# Patient Record
Sex: MALE | Race: WHITE | NOT HISPANIC OR LATINO | Employment: FULL TIME | URBAN - METROPOLITAN AREA
[De-identification: names, ages, dates, MRNs, and addresses within clinical notes are randomized per-mention and may not be internally consistent; named-entity substitution may affect disease eponyms.]

---

## 2017-05-17 ENCOUNTER — DOCUMENTATION ONLY (OUTPATIENT)
Dept: LAB | Facility: CLINIC | Age: 32
End: 2017-05-17

## 2017-05-17 NOTE — PROGRESS NOTES
Patient has never been seen here before.  Will need to see provider first and if labs needed provider will order.      Left message informing patient to see provider first at 8am and lab orders will be determined.  If further questions to please call clinic and ask to speak with nurse.    Wilma Linton RN,   [facility], [facility]

## 2017-05-19 ENCOUNTER — OFFICE VISIT (OUTPATIENT)
Dept: PEDIATRICS | Facility: CLINIC | Age: 32
End: 2017-05-19
Payer: COMMERCIAL

## 2017-05-19 VITALS
BODY MASS INDEX: 29.2 KG/M2 | DIASTOLIC BLOOD PRESSURE: 80 MMHG | SYSTOLIC BLOOD PRESSURE: 128 MMHG | HEIGHT: 70 IN | TEMPERATURE: 96.8 F | WEIGHT: 204 LBS | OXYGEN SATURATION: 98 % | HEART RATE: 72 BPM

## 2017-05-19 DIAGNOSIS — Z00.00 ROUTINE GENERAL MEDICAL EXAMINATION AT A HEALTH CARE FACILITY: Primary | ICD-10-CM

## 2017-05-19 DIAGNOSIS — S76.312A HAMSTRING MUSCLE STRAIN, LEFT, INITIAL ENCOUNTER: ICD-10-CM

## 2017-05-19 DIAGNOSIS — E66.3 OVERWEIGHT (BMI 25.0-29.9): ICD-10-CM

## 2017-05-19 DIAGNOSIS — Z23 NEED FOR DIPHTHERIA-TETANUS-PERTUSSIS (TDAP) VACCINE, ADULT/ADOLESCENT: ICD-10-CM

## 2017-05-19 DIAGNOSIS — Z13.220 LIPID SCREENING: ICD-10-CM

## 2017-05-19 DIAGNOSIS — Z13.1 SCREENING FOR DIABETES MELLITUS: ICD-10-CM

## 2017-05-19 LAB
CHOLEST SERPL-MCNC: 224 MG/DL
GLUCOSE SERPL-MCNC: 99 MG/DL (ref 70–99)
HDLC SERPL-MCNC: 65 MG/DL
LDLC SERPL CALC-MCNC: 141 MG/DL
NONHDLC SERPL-MCNC: 159 MG/DL
TRIGL SERPL-MCNC: 91 MG/DL

## 2017-05-19 PROCEDURE — 82947 ASSAY GLUCOSE BLOOD QUANT: CPT | Performed by: INTERNAL MEDICINE

## 2017-05-19 PROCEDURE — 36415 COLL VENOUS BLD VENIPUNCTURE: CPT | Performed by: INTERNAL MEDICINE

## 2017-05-19 PROCEDURE — 80061 LIPID PANEL: CPT | Performed by: INTERNAL MEDICINE

## 2017-05-19 PROCEDURE — 99385 PREV VISIT NEW AGE 18-39: CPT | Mod: 25 | Performed by: INTERNAL MEDICINE

## 2017-05-19 PROCEDURE — 90471 IMMUNIZATION ADMIN: CPT | Performed by: INTERNAL MEDICINE

## 2017-05-19 PROCEDURE — 90715 TDAP VACCINE 7 YRS/> IM: CPT | Performed by: INTERNAL MEDICINE

## 2017-05-19 NOTE — PROGRESS NOTES
SUBJECTIVE:     CC: Andre Lyons is an 31 year old male who presents for preventative health visit.     New pt to our clinic. Works at [employer].  with 4 children ages 11 weeks to 9 years.     Healthy Habits:    Do you get at least three servings of calcium containing foods daily (dairy, green leafy vegetables, etc.)? yes    Amount of exercise or daily activities, outside of work: 5-6 day(s) per week    Problems taking medications regularly No    Medication side effects: No    Have you had an eye exam in the past two years? no    Do you see a dentist twice per year? yes    Do you have sleep apnea, excessive snoring or daytime drowsiness?no        PROBLEMS TO ADD ON...  Left Hip pain when running, started 2 years ago after running half marathon, did exercise for hamstring tendonitis, it went away and then recently came back after he started running. Thinks about seeing sports medicine.     Today's PHQ-2 Score: No flowsheet data found.    Abuse: Current or Past(Physical, Sexual or Emotional)- No  Do you feel safe in your environment - Yes    Social History   Substance Use Topics     Smoking status: Never Smoker     Smokeless tobacco: Never Used     Alcohol use No     The patient does not drink >3 drinks per day nor >7 drinks per week.    Last PSA: No results found for: PSA    No results for input(s): CHOL, HDL, LDL, TRIG, CHOLHDLRATIO, NHDL in the last 26910 hours.    Reviewed orders with patient. Reviewed health maintenance and updated orders accordingly - Yes    Reviewed and updated as needed this visit by clinical staff  Tobacco  Allergies  Meds  Med Hx  Surg Hx  Fam Hx  Soc Hx        Reviewed and updated as needed this visit by Provider            ROS:  C: NEGATIVE for fever, chills, change in weight  I: NEGATIVE for worrisome rashes, moles or lesions  E: NEGATIVE for vision changes or irritation  ENT: NEGATIVE for ear, mouth and throat problems  R: NEGATIVE for significant cough or SOB  CV:  "NEGATIVE for chest pain, palpitations or peripheral edema  GI: NEGATIVE for nausea, abdominal pain, heartburn, or change in bowel habits   male: negative for dysuria, hematuria, decreased urinary stream, erectile dysfunction, urethral discharge  M: NEGATIVE for significant arthralgias or myalgia  N: NEGATIVE for weakness, dizziness or paresthesias  P: NEGATIVE for changes in mood or affect    Problem list, Medication list, Allergies, and Medical/Social/Surgical histories reviewed in [facility] and updated as appropriate.  OBJECTIVE:     /80 (Cuff Size: Adult Regular)  Pulse 72  Temp 96.8  F (36  C) (Temporal)  Ht 5' 10\" (1.778 m)  Wt 204 lb (92.5 kg)  SpO2 98%  BMI 29.27 kg/m2  EXAM:  GENERAL: healthy, alert and no distress  EYES: Eyes grossly normal to inspection, PERRL and conjunctivae and sclerae normal  HENT: ear canals and TM's normal, nose and mouth without ulcers or lesions  NECK: no adenopathy, no asymmetry, masses, or scars and thyroid normal to palpation  RESP: lungs clear to auscultation - no rales, rhonchi or wheezes  CV: regular rate and rhythm, normal S1 S2, no S3 or S4, no murmur, click or rub, no peripheral edema and peripheral pulses strong  ABDOMEN: soft, nontender, no hepatosplenomegaly, no masses and bowel sounds normal  MS: no gross musculoskeletal defects noted, no edema  SKIN: no suspicious lesions or rashes  NEURO: Normal strength and tone, mentation intact and speech normal  PSYCH: mentation appears normal, affect normal/bright    ASSESSMENT/PLAN:         ICD-10-CM    1. Routine general medical examination at a health care facility Z00.00    2. Need for diphtheria-tetanus-pertussis (Tdap) vaccine, adult/adolescent Z23 TDAP VACCINE (ADACEL)   3. Lipid screening Z13.220 LIPID REFLEX TO DIRECT LDL PANEL   4. Screening for diabetes mellitus Z13.1 Glucose   5. Hamstring muscle strain, left, initial encounter S76.312A SPORTS MEDICINE REFERRAL     Refer to sports medicine for the hamstring " "strain.     COUNSELING:  Reviewed preventive health counseling, as reflected in patient instructions    BP Screening:   Last 3 BP Readings:    BP Readings from Last 3 Encounters:   05/19/17 128/80       The following was recommended to the patient:  Re-screen BP within a year and recommended lifestyle modifications     reports that he has never smoked. He has never used smokeless tobacco.    Estimated body mass index is 29.27 kg/(m^2) as calculated from the following:    Height as of this encounter: 5' 10\" (1.778 m).    Weight as of this encounter: 204 lb (92.5 kg).   Weight management plan: plans to work on weight loss, his goal is 180 lbs.     Counseling Resources:  ATP IV Guidelines  Pooled Cohorts Equation Calculator  FRAX Risk Assessment  ICSI Preventive Guidelines  Dietary Guidelines for Americans, 2010  USDA's MyPlate  ASA Prophylaxis  Lung CA Screening    Nidia Martines MD, MD  [facility]  "

## 2017-05-19 NOTE — NURSING NOTE
"Chief Complaint   Patient presents with     Physical     Left Hip pain when running       Initial /80 (Cuff Size: Adult Regular)  Pulse 72  Temp 96.8  F (36  C) (Temporal)  Ht 5' 10\" (1.778 m)  Wt 204 lb (92.5 kg)  SpO2 98%  BMI 29.27 kg/m2 Estimated body mass index is 29.27 kg/(m^2) as calculated from the following:    Height as of this encounter: 5' 10\" (1.778 m).    Weight as of this encounter: 204 lb (92.5 kg).  Medication Reconciliation: complete    "

## 2017-05-19 NOTE — PATIENT INSTRUCTIONS
Make appointment(s) for:   -- get labs.  -- sports medicine appointment.             Preventive Health Recommendations  Male Ages 26 - 39    Yearly exam:             See your health care provider every year in order to  o   Review health changes.   o   Discuss preventive care.    o   Review your medicines if your doctor has prescribed any.    You should be tested each year for STDs (sexually transmitted diseases), if you re at risk.     After age 35, talk to your provider about cholesterol testing. If you are at risk for heart disease, have your cholesterol tested at least every 5 years.     If you are at risk for diabetes, you should have a diabetes test (fasting glucose).  Shots: Get a flu shot each year. Get a tetanus shot every 10 years.     Nutrition:    Eat at least 5 servings of fruits and vegetables daily.     Eat whole-grain bread, whole-wheat pasta and brown rice instead of white grains and rice.     Talk to your provider about Calcium and Vitamin D.     Lifestyle    Exercise for at least 150 minutes a week (30 minutes a day, 5 days a week). This will help you control your weight and prevent disease.     Limit alcohol to one drink per day.     No smoking.     Wear sunscreen to prevent skin cancer.     See your dentist every six months for an exam and cleaning.

## 2017-05-19 NOTE — MR AVS SNAPSHOT
After Visit Summary   5/19/2017    Andre Lyons    MRN: [id number]           Patient Information     Date Of Birth          1985        Visit Information        Provider Department      5/19/2017 8:00 AM Nidia Martines MD [facility]        Today's Diagnoses     Routine general medical examination at a health care facility    -  1    Need for diphtheria-tetanus-pertussis (Tdap) vaccine, adult/adolescent        Lipid screening        Screening for diabetes mellitus        Hamstring muscle strain, left, initial encounter          Care Instructions    Make appointment(s) for:   -- get labs.  -- sports medicine appointment.             Preventive Health Recommendations  Male Ages 26 - 39    Yearly exam:             See your health care provider every year in order to  o   Review health changes.   o   Discuss preventive care.    o   Review your medicines if your doctor has prescribed any.    You should be tested each year for STDs (sexually transmitted diseases), if you re at risk.     After age 35, talk to your provider about cholesterol testing. If you are at risk for heart disease, have your cholesterol tested at least every 5 years.     If you are at risk for diabetes, you should have a diabetes test (fasting glucose).  Shots: Get a flu shot each year. Get a tetanus shot every 10 years.     Nutrition:    Eat at least 5 servings of fruits and vegetables daily.     Eat whole-grain bread, whole-wheat pasta and brown rice instead of white grains and rice.     Talk to your provider about Calcium and Vitamin D.     Lifestyle    Exercise for at least 150 minutes a week (30 minutes a day, 5 days a week). This will help you control your weight and prevent disease.     Limit alcohol to one drink per day.     No smoking.     Wear sunscreen to prevent skin cancer.     See your dentist every six months for an exam and cleaning.           Follow-ups after your visit        Additional Services      SPORTS MEDICINE REFERRAL       Your provider has referred you to:  [facility]: [facility] [place], [place] [phone]   [email or web address]    Please be aware that coverage of these services is subject to the terms and limitations of your health insurance plan.  Call member services at your health plan with any benefit or coverage questions.      Please bring the following to your appointment:    >>   Any x-rays, CTs or MRIs which have been performed.  Contact the facility where they were done to arrange for  prior to your scheduled appointment.    >>   List of current medications   >>   This referral request   >>   Any documents/labs given to you for this referral                  Who to contact     If you have questions or need follow up information about today's clinic visit or your schedule please contact [facility] directly at [phone].  Normal or non-critical lab and imaging results will be communicated to you by MyChart, letter or phone within 4 business days after the clinic has received the results. If you do not hear from us within 7 days, please contact the clinic through MyChart or phone. If you have a critical or abnormal lab result, we will notify you by phone as soon as possible.  Submit refill requests through [employer] Think or call your pharmacy and they will forward the refill request to us. Please allow 3 business days for your refill to be completed.          Additional Information About Your Visit        Nano Think Information     N[employer] Think is an electronic gateway that provides easy, online access to your medical records. With Nano Think, you can request a clinic appointment, read your test results, renew a prescription or communicate with your care team.     To sign up for Nano Think visit the website at [email or web address]   You will be asked to enter the access code listed below, as well as some personal information. Please  "follow the directions to create your username and password.     Your access code is: [email or web address]  Expires: 2017  6:14 PM     Your access code will  in 90 days. If you need help or a new code, please contact your [facility] or call [phone] for assistance.        Care EveryWhere ID     This is your Care EveryWhere ID. This could be used by other organizations to access your [facility] medical records  [id number]        Your Vitals Were     Pulse Temperature Height Pulse Oximetry BMI (Body Mass Index)       72 96.8  F (36  C) (Temporal) 5' 10\" (1.778 m) 98% 29.27 kg/m2        Blood Pressure from Last 3 Encounters:   17 128/80    Weight from Last 3 Encounters:   17 204 lb (92.5 kg)              We Performed the Following     Glucose     LIPID REFLEX TO DIRECT LDL PANEL     SPORTS MEDICINE REFERRAL     TDAP VACCINE (ADACEL)        Primary Care Provider    [facility]       No address on file        Thank you!     Thank you for choosing [facility]  for your care. Our goal is always to provide you with excellent care. Hearing back from our patients is one way we can continue to improve our services. Please take a few minutes to complete the written survey that you may receive in the mail after your visit with us. Thank you!             Your Updated Medication List - Protect others around you: Learn how to safely use, store and throw away your medicines at www.disposemymeds.org.      Notice  As of 2017  8:35 AM    You have not been prescribed any medications.      "

## 2017-05-19 NOTE — PROGRESS NOTES
Dear Andre,   Here are your recent results.   -- glucose is normal.   -- Lipid panel is borderline elevated. Prescription medication is not needed at this time. Healthy eating with low fat low cholesterol diet, exercise 30 minutes 3-5 times a week will help improve cholesterol. Work on weight loss. We'll monitor this annually with your physical.       Please call or Mychart to our office if you have further questions.     Nidia Martines MD

## 2017-05-25 ENCOUNTER — OFFICE VISIT (OUTPATIENT)
Dept: ORTHOPEDICS | Facility: CLINIC | Age: 32
End: 2017-05-25
Attending: INTERNAL MEDICINE
Payer: COMMERCIAL

## 2017-05-25 ENCOUNTER — RADIANT APPOINTMENT (OUTPATIENT)
Dept: GENERAL RADIOLOGY | Facility: CLINIC | Age: 32
End: 2017-05-25
Attending: FAMILY MEDICINE
Payer: COMMERCIAL

## 2017-05-25 VITALS
BODY MASS INDEX: 29.2 KG/M2 | OXYGEN SATURATION: 98 % | HEART RATE: 99 BPM | HEIGHT: 70 IN | SYSTOLIC BLOOD PRESSURE: 120 MMHG | WEIGHT: 204 LBS | DIASTOLIC BLOOD PRESSURE: 70 MMHG

## 2017-05-25 DIAGNOSIS — S76.302A HAMSTRING INJURY, LEFT, INITIAL ENCOUNTER: ICD-10-CM

## 2017-05-25 DIAGNOSIS — S76.302A HAMSTRING INJURY, LEFT, INITIAL ENCOUNTER: Primary | ICD-10-CM

## 2017-05-25 PROCEDURE — 99213 OFFICE O/P EST LOW 20 MIN: CPT | Performed by: FAMILY MEDICINE

## 2017-05-25 PROCEDURE — 73522 X-RAY EXAM HIPS BI 3-4 VIEWS: CPT | Performed by: RADIOLOGY

## 2017-05-25 RX ORDER — NAPROXEN 500 MG/1
500 TABLET ORAL 2 TIMES DAILY PRN
Qty: 30 TABLET | Refills: 1 | Status: SHIPPED | OUTPATIENT
Start: 2017-05-25 | End: 2020-10-12

## 2017-05-25 ASSESSMENT — PAIN SCALES - GENERAL: PAINLEVEL: MODERATE PAIN (5)

## 2017-05-25 NOTE — NURSING NOTE
"Andre Lyons's goals for this visit include: Evaluate upper hamstring, possibly strain after marathon.  He requests these members of his care team be copied on today's visit information: yes    PCP: [facility]    Referring Provider:  Nidia Martines MD  [facility]  [address] [place] [address]    Chief Complaint   Patient presents with     Consult     Possible left leg injury after marathon.        Initial /70  Pulse 99  Ht 1.778 m (5' 10\")  Wt 92.5 kg (204 lb)  SpO2 98%  BMI 29.27 kg/m2 Estimated body mass index is 29.27 kg/(m^2) as calculated from the following:    Height as of this encounter: 1.778 m (5' 10\").    Weight as of this encounter: 92.5 kg (204 lb).  Medication Reconciliation: complete    "

## 2017-05-25 NOTE — PROGRESS NOTES
"HISTORY OF PRESENT ILLNESS  Mr. Lyons is a pleasant 31 year old year old male who presents to clinic today with leg pain.  He's seen at the request of Dr. Martines.  Andre explains that about a week or 2 after a half [place] relay he noticed a tight, nagging injury in his left leg.  Proximal left leg, painful with running, sitting.  Looked this up in [employer], thought it might be a proximal hamstring strain, stopped running, slowly got back in to activity.  He was pain-free for some time. Over the course of his recovery started to bother him more.  Points to his proximal medial left ischial tuberosity. Denies numbness or tingling, denies weakness down the leg.  Andre is running about 6 miles a day at 7 minute pace.  Location:   Quality:  achy pain    Severity: Moderate to severe  Timing: occurs intermittently  Context: occurs while running, worse as activity intensifies  Modifying factors: resting and non-use makes it better, movement and use makes it worse  Associated signs & symptoms: none  Previous similar pain: no  Additional history: as documented    MEDICAL HISTORY  Patient Active Problem List   Diagnosis     Overweight (BMI 25.0-29.9)       No current outpatient prescriptions on file.       No Known Allergies    No family history on file.    Additional medical/Social/Surgical histories reviewed in [facility] and updated as appropriate.     REVIEW OF SYSTEMS (5/25/2017)  10 point ROS of systems including Constitutional, Eyes, Respiratory, Cardiovascular, Gastroenterology, Genitourinary, Integumentary, Musculoskeletal, Psychiatric were all negative except for pertinent positives noted in my HPI.     PHYSICAL EXAM  Vitals:    05/25/17 0703   BP: 120/70   Pulse: 99   SpO2: 98%   Weight: 92.5 kg (204 lb)   Height: 1.778 m (5' 10\")     General  - normal appearance, in no obvious distress  CV  - normal femoral pulse  Pulm  - normal respiratory pattern, non-labored  Musculoskeletal - left hip  - stance: normal gait " without limp, normal single leg squat, no obvious leg length discrepancy, normal heel and toe walk  - inspection: no swelling or effusion,  normal bone and joint alignment, no obvious deformity  - palpation: mildly tender at medial left ischial tuberosity  - ROM: normal flexion, extension, IR, ER, abduction, adduction, not painful, no crepitus  - strength: 5/5 in all planes  - special tests:  (-) ELSIE  (-) FADIR  Neuro  - no sensory or motor deficit, grossly normal coordination, normal muscle tone  Skin  - no ecchymosis, erythema, warmth, or induration, no obvious rash  Psych  - interactive, appropriate, normal mood and affect          ASSESSMENT & PLAN  Mr. Lyons is a 31 year old year old male who is in the office today with a chronic hamstring injury.    I ordered & reviewed an xray of his pelvis which shows minimal osteophytosis of the ischial tuberosities..    Andre and I had a good discussion centering around management of proximal hamstring injuries.  We discussed that this is a difficult area to ultimately heal given the forces placed on the tendon with the eccentric load caused by running.  I do think it's advisable to decrease his running pace until he is pain-free.  I did prescribe him a short course of naproxen to take twice daily for the next 2 weeks.    I'm also referring him to physical therapy.    I recommend that Andre return to my office for a re-examination in-  weeks.  He should follow up sooner if the condition worsens or if other problems arise.    It was a pleasure taking care of Andre.        Maximo Zapata DO, CAM

## 2017-05-25 NOTE — MR AVS SNAPSHOT
After Visit Summary   2017    Andre Lyons    MRN: [id number]           Patient Information     Date Of Birth          1985        Visit Information        Provider Department      2017 7:00 AM Maximo Zapata, DO [facility]        Today's Diagnoses     Hamstring injury, left, initial encounter    -  1      Care Instructions    Thanks for coming today.  Ortho[facility] Medicine Clinic  [address] [place] [address]    To schedule future appointments in Ortho Clinic, you may call [phone].    To schedule ordered imaging by your provider:   Call Central Imaging [phone]    To schedule an injection ordered by your provider:  Call Central Imaging Injection scheduling line: [phone]  Smarterer available online at:  [email or web address]    Please call if any further questions or concerns ([phone]).  Clinic hours 8 am to 5 pm.    Return to clinic (call) if symptoms worsen or fail to improve.            Follow-ups after your visit        Who to contact     If you have questions or need follow up information about today's clinic visit or your schedule please contact [facility] directly at [phone].  Normal or non-critical lab and imaging results will be communicated to you by SpinX Technologieshart, letter or phone within 4 business days after the clinic has received the results. If you do not hear from us within 7 days, please contact the clinic through SpinX Technologieshart or phone. If you have a critical or abnormal lab result, we will notify you by phone as soon as possible.  Submit refill requests through Smarterer or call your pharmacy and they will forward the refill request to us. Please allow 3 business days for your refill to be completed.          Additional Information About Your Visit        MyChart Information     Smarterer gives you secure access to your electronic health record. If you see a primary care provider, you can also  "send messages to your care team and make appointments. If you have questions, please call your primary care clinic.  If you do not have a primary care provider, please call [phone] and they will assist you.      I-Pulse is an electronic gateway that provides easy, online access to your medical records. With I-Pulse, you can request a clinic appointment, read your test results, renew a prescription or communicate with your care team.     To access your existing account, please contact your [facility] or call [phone] for assistance.        Care EveryWhere ID     This is your Care EveryWhere ID. This could be used by other organizations to access your [facility] medical records  [id number]        Your Vitals Were     Pulse Height Pulse Oximetry BMI (Body Mass Index)          99 1.778 m (5' 10\") 98% 29.27 kg/m2         Blood Pressure from Last 3 Encounters:   05/25/17 120/70   05/19/17 128/80    Weight from Last 3 Encounters:   05/25/17 92.5 kg (204 lb)   05/19/17 92.5 kg (204 lb)              Today, you had the following     No orders found for display         Today's Medication Changes          These changes are accurate as of: 5/25/17  7:54 AM.  If you have any questions, ask your nurse or doctor.               Start taking these medicines.        Dose/Directions    naproxen 500 MG tablet   Commonly known as:  NAPROSYN   Used for:  Hamstring injury, left, initial encounter   Started by:  Maximo Zapata DO        Dose:  500 mg   Take 1 tablet (500 mg) by mouth 2 times daily as needed for moderate pain   Quantity:  30 tablet   Refills:  1            Where to get your medicines      These medications were sent to [facility] [address] [facility] - [place], [place] - [address]  [address], [facility] [address]     Phone:  [phone]     naproxen 500 MG tablet                Primary Care Provider    [facility]       No address on file      "   Thank you!     Thank you for choosing [facility]  for your care. Our goal is always to provide you with excellent care. Hearing back from our patients is one way we can continue to improve our services. Please take a few minutes to complete the written survey that you may receive in the mail after your visit with us. Thank you!             Your Updated Medication List - Protect others around you: Learn how to safely use, store and throw away your medicines at www.disposemymeds.org.          This list is accurate as of: 5/25/17  7:54 AM.  Always use your most recent med list.                   Brand Name Dispense Instructions for use    naproxen 500 MG tablet    NAPROSYN    30 tablet    Take 1 tablet (500 mg) by mouth 2 times daily as needed for moderate pain

## 2017-05-25 NOTE — PATIENT INSTRUCTIONS
Thanks for coming today.  Ortho[facility] Medicine Clinic  [address] [place] [address]    To schedule future appointments in Ortho Clinic, you may call [phone].    To schedule ordered imaging by your provider:   Call Central Imaging [phone]    To schedule an injection ordered by your provider:  Call Central Imaging Injection scheduling line: [phone]  [employer]le [employer]hart available online at:  [email or web address]    Please call if any further questions or concerns ([phone]).  Clinic hours 8 am to 5 pm.    Return to clinic (call) if symptoms worsen or fail to improve.

## 2020-03-10 ENCOUNTER — HEALTH MAINTENANCE LETTER (OUTPATIENT)
Age: 35
End: 2020-03-10

## 2020-10-12 ENCOUNTER — OFFICE VISIT (OUTPATIENT)
Dept: PEDIATRICS | Facility: CLINIC | Age: 35
End: 2020-10-12
Payer: COMMERCIAL

## 2020-10-12 VITALS
SYSTOLIC BLOOD PRESSURE: 110 MMHG | OXYGEN SATURATION: 94 % | HEART RATE: 59 BPM | BODY MASS INDEX: 30.52 KG/M2 | HEIGHT: 70 IN | WEIGHT: 213.2 LBS | DIASTOLIC BLOOD PRESSURE: 60 MMHG | TEMPERATURE: 97.5 F

## 2020-10-12 DIAGNOSIS — Z00.00 ROUTINE GENERAL MEDICAL EXAMINATION AT A HEALTH CARE FACILITY: Primary | ICD-10-CM

## 2020-10-12 DIAGNOSIS — R23.9 SKIN CHANGE: ICD-10-CM

## 2020-10-12 DIAGNOSIS — Z13.1 SCREENING FOR DIABETES MELLITUS (DM): ICD-10-CM

## 2020-10-12 DIAGNOSIS — Z13.6 CARDIOVASCULAR SCREENING; LDL GOAL LESS THAN 160: ICD-10-CM

## 2020-10-12 DIAGNOSIS — Z23 FLU VACCINE NEED: ICD-10-CM

## 2020-10-12 LAB
ANION GAP SERPL CALCULATED.3IONS-SCNC: 1 MMOL/L (ref 3–14)
BUN SERPL-MCNC: 14 MG/DL (ref 7–30)
CALCIUM SERPL-MCNC: 8.3 MG/DL (ref 8.5–10.1)
CHLORIDE SERPL-SCNC: 108 MMOL/L (ref 94–109)
CHOLEST SERPL-MCNC: 198 MG/DL
CO2 SERPL-SCNC: 32 MMOL/L (ref 20–32)
CREAT SERPL-MCNC: 0.92 MG/DL (ref 0.66–1.25)
GFR SERPL CREATININE-BSD FRML MDRD: >90 ML/MIN/{1.73_M2}
GLUCOSE SERPL-MCNC: 96 MG/DL (ref 70–99)
HDLC SERPL-MCNC: 57 MG/DL
LDLC SERPL CALC-MCNC: 120 MG/DL
NONHDLC SERPL-MCNC: 141 MG/DL
POTASSIUM SERPL-SCNC: 4.6 MMOL/L (ref 3.4–5.3)
SODIUM SERPL-SCNC: 141 MMOL/L (ref 133–144)
TRIGL SERPL-MCNC: 103 MG/DL

## 2020-10-12 PROCEDURE — 90686 IIV4 VACC NO PRSV 0.5 ML IM: CPT | Performed by: NURSE PRACTITIONER

## 2020-10-12 PROCEDURE — 80061 LIPID PANEL: CPT | Performed by: NURSE PRACTITIONER

## 2020-10-12 PROCEDURE — 80048 BASIC METABOLIC PNL TOTAL CA: CPT | Performed by: NURSE PRACTITIONER

## 2020-10-12 PROCEDURE — 36415 COLL VENOUS BLD VENIPUNCTURE: CPT | Performed by: NURSE PRACTITIONER

## 2020-10-12 PROCEDURE — 99385 PREV VISIT NEW AGE 18-39: CPT | Mod: 25 | Performed by: NURSE PRACTITIONER

## 2020-10-12 PROCEDURE — 90471 IMMUNIZATION ADMIN: CPT | Performed by: NURSE PRACTITIONER

## 2020-10-12 ASSESSMENT — MIFFLIN-ST. JEOR: SCORE: 1898.32

## 2020-10-12 NOTE — PROGRESS NOTES
3  SUBJECTIVE:   CC: Andre yLons is an 35 year old male who presents for preventive health visit.     Patient has been advised of split billing requirements and indicates understanding: Yes     Healthy Habits:    Do you get at least three servings of calcium containing foods daily (dairy, green leafy vegetables, etc.)? yes    Amount of exercise or daily activities, outside of work: 5 day(s) per week    Problems taking medications regularly not applicable    Medication side effects: No    Have you had an eye exam in the past two years? no    Do you see a dentist twice per year? yes    Do you have sleep apnea, excessive snoring or daytime drowsiness?no      Today's PHQ-2 Score:   PHQ-2 ( 1999 Pfizer) 10/12/2020   Q1: Little interest or pleasure in doing things 0   Q2: Feeling down, depressed or hopeless 0   PHQ-2 Score 0       Abuse: Current or Past(Physical, Sexual or Emotional)- No  Do you feel safe in your environment? Yes        Social History     Tobacco Use     Smoking status: Never Smoker     Smokeless tobacco: Never Used   Substance Use Topics     Alcohol use: No     If you drink alcohol do you typically have >3 drinks per day or >7 drinks per week? No                      Last PSA: No results found for: PSA    Reviewed orders with patient. Reviewed health maintenance and updated orders accordingly - Yes  BP Readings from Last 3 Encounters:   10/12/20 110/60   05/25/17 120/70   05/19/17 128/80    Wt Readings from Last 3 Encounters:   10/12/20 96.7 kg (213 lb 3.2 oz)   05/25/17 92.5 kg (204 lb)   05/19/17 92.5 kg (204 lb)                  Patient Active Problem List   Diagnosis     Overweight (BMI 25.0-29.9)     Past Surgical History:   Procedure Laterality Date     APPENDECTOMY  03/2010       Social History     Tobacco Use     Smoking status: Never Smoker     Smokeless tobacco: Never Used   Substance Use Topics     Alcohol use: No     History reviewed. No pertinent family history.      No current  "outpatient medications on file.     No Known Allergies    Reviewed and updated as needed this visit by clinical staff  Tobacco  Allergies  Meds   Med Hx  Surg Hx  Fam Hx  Soc Hx        Reviewed and updated as needed this visit by Provider                History reviewed. No pertinent past medical history.   Past Surgical History:   Procedure Laterality Date     APPENDECTOMY  03/2010       ROS:  CONSTITUTIONAL:POSITIVE  for weight gain and NEGATIVE  for sweats  INTEGUMENTARY/SKIN: NEGATIVE for non-healing lesion  and POSITIVE for spots near his nose   EYES: NEGATIVE for vision changes or irritation  ENT: NEGATIVE for ear, mouth and throat problems  RESP:NEGATIVE for significant cough or SOB  CV: NEGATIVE for chest pain, palpitations or peripheral edema  GI: NEGATIVE for nausea, abdominal pain, heartburn, or change in bowel habits   male: negative for dysuria, hematuria, decreased urinary stream, erectile dysfunction, urethral discharge  MUSCULOSKELETAL:POSITIVE  for joint pain intermittently in knees  and NEGATIVE for joint swelling  and joint warmth   NEURO: NEGATIVE for weakness, dizziness or paresthesias  ENDOCRINE: NEGATIVE for temperature intolerance, skin/hair changes  HEME/ALLERGY/IMMUNE: NEGATIVE for bleeding problems  PSYCHIATRIC: NEGATIVE for changes in mood or affect    OBJECTIVE:   /60 (BP Location: Right arm, Patient Position: Sitting, Cuff Size: Adult Regular)   Pulse 59   Temp 97.5  F (36.4  C) (Temporal)   Ht 1.778 m (5' 10\")   Wt 96.7 kg (213 lb 3.2 oz)   SpO2 94%   BMI 30.59 kg/m    EXAM:  GENERAL: alert, no distress and over weight  EYES: Eyes grossly normal to inspection and conjunctivae and sclerae normal  HENT: ear canals and TM's normal, nose and mouth without ulcers or lesions  NECK: no adenopathy, no asymmetry, masses, or scars and thyroid normal to palpation  RESP: lungs clear to auscultation - no rales, rhonchi or wheezes  CV: regular rates and rhythm, no murmur, click " or rub, peripheral pulses strong and no peripheral edema  ABDOMEN: soft, nontender, no hepatosplenomegaly, no masses and bowel sounds normal   (male): normal male genitalia without lesions or urethral discharge, no hernia  MS: no gross musculoskeletal defects noted, no edema  SKIN: no suspicious lesions or rashes  NEURO: Normal strength and tone, mentation intact and speech normal  PSYCH: mentation appears normal, affect normal/bright  LYMPH: no cervical, supraclavicular, axillary, or inguinal adenopathy    Diagnostic Test Results:  Labs reviewed in Epic  Results for orders placed or performed in visit on 10/12/20   Basic metabolic panel     Status: Abnormal   Result Value Ref Range    Sodium 141 133 - 144 mmol/L    Potassium 4.6 3.4 - 5.3 mmol/L    Chloride 108 94 - 109 mmol/L    Carbon Dioxide 32 20 - 32 mmol/L    Anion Gap 1 (L) 3 - 14 mmol/L    Glucose 96 70 - 99 mg/dL    Urea Nitrogen 14 7 - 30 mg/dL    Creatinine 0.92 0.66 - 1.25 mg/dL    GFR Estimate >90 >60 mL/min/[1.73_m2]    GFR Estimate If Black >90 >60 mL/min/[1.73_m2]    Calcium 8.3 (L) 8.5 - 10.1 mg/dL   Lipid panel reflex to direct LDL Fasting     Status: Abnormal   Result Value Ref Range    Cholesterol 198 <200 mg/dL    Triglycerides 103 <150 mg/dL    HDL Cholesterol 57 >39 mg/dL    LDL Cholesterol Calculated 120 (H) <100 mg/dL    Non HDL Cholesterol 141 (H) <130 mg/dL       ASSESSMENT/PLAN:   Andre was seen today for physical.    Diagnoses and all orders for this visit:    Routine general medical examination at a health care facility  -     JUST IN CASE  -     Basic metabolic panel  -     Lipid panel reflex to direct LDL Fasting    Flu vaccine need  -     INFLUENZA VACCINE IM > 6 MONTHS VALENT IIV4 [51636]  -     INITIAL VACCINE ADMINSTRATION    CARDIOVASCULAR SCREENING; LDL GOAL LESS THAN 160  -     Lipid panel reflex to direct LDL Fasting    Screening for diabetes mellitus (DM)  -     JUST IN CASE  -     Basic metabolic panel    Skin change  "nose   -     DERMATOLOGY ADULT REFERRAL; Future    PLAN:   Patient needs to follow up in if no improvement,or sooner if worsening of symptoms or other symptoms develop.  CONSULTATION/REFERRAL to Dermatology  Work on weight loss  Regular exercise  Will follow up and/or notify patient of  results via My Chart to determine further need for followup  Follow up office visit in one year for annual health maintenance exam, sooner PRN.    Patient has been advised of split billing requirements and indicates understanding: Yes  COUNSELING:  Reviewed preventive health counseling, as reflected in patient instructions  Special attention given to:        Regular exercise       Healthy diet/nutrition       Vision screening       Immunizations    Vaccinated for: Influenza          Estimated body mass index is 30.59 kg/m  as calculated from the following:    Height as of this encounter: 1.778 m (5' 10\").    Weight as of this encounter: 96.7 kg (213 lb 3.2 oz).    Weight management plan: Discussed healthy diet and exercise guidelines    He reports that he has never smoked. He has never used smokeless tobacco.      Counseling Resources:  ATP IV Guidelines  Pooled Cohorts Equation Calculator  FRAX Risk Assessment  ICSI Preventive Guidelines  Dietary Guidelines for Americans, 2010  USDA's MyPlate  ASA Prophylaxis  Lung CA Screening    Sarah Zapata, LUIS ALFREDO [facility]  "

## 2020-10-12 NOTE — PATIENT INSTRUCTIONS
PLAN:   1.  Orders Placed This Encounter   Procedures     INFLUENZA VACCINE IM > 6 MONTHS VALENT IIV4 [90215]     INITIAL VACCINE ADMINSTRATION     JUST IN CASE     Basic metabolic panel     Lipid panel reflex to direct LDL Fasting     2. Patient needs to follow up in if no improvement,or sooner if worsening of symptoms or other symptoms develop.  CONSULTATION/REFERRAL to Dermatology  Work on weight loss  Regular exercise  Will follow up and/or notify patient of  results via My Chart to determine further need for followup  Follow up office visit in one year for annual health maintenance exam, sooner PRN.    Preventive Health Recommendations  Male Ages 26 - 39    Yearly exam:             See your health care provider every year in order to  o   Review health changes.   o   Discuss preventive care.    o   Review your medicines if your doctor has prescribed any.    You should be tested each year for STDs (sexually transmitted diseases), if you re at risk.     After age 35, talk to your provider about cholesterol testing. If you are at risk for heart disease, have your cholesterol tested at least every 5 years.     If you are at risk for diabetes, you should have a diabetes test (fasting glucose).  Shots: Get a flu shot each year. Get a tetanus shot every 10 years.     Nutrition:    Eat at least 5 servings of fruits and vegetables daily.     Eat whole-grain bread, whole-wheat pasta and brown rice instead of white grains and rice.     Get adequate Calcium and Vitamin D.     Lifestyle    Exercise for at least 150 minutes a week (30 minutes a day, 5 days a week). This will help you control your weight and prevent disease.     Limit alcohol to one drink per day.     No smoking.     Wear sunscreen to prevent skin cancer.     See your dentist every six months for an exam and cleaning.

## 2020-10-12 NOTE — NURSING NOTE
"Chief Complaint   Patient presents with     Physical     ENRIQUE       Initial /60 (BP Location: Right arm, Patient Position: Sitting, Cuff Size: Adult Regular)   Pulse 59   Temp 97.5  F (36.4  C) (Temporal)   Ht 1.778 m (5' 10\")   Wt 96.7 kg (213 lb 3.2 oz)   SpO2 94%   BMI 30.59 kg/m   Estimated body mass index is 30.59 kg/m  as calculated from the following:    Height as of this encounter: 1.778 m (5' 10\").    Weight as of this encounter: 96.7 kg (213 lb 3.2 oz).  Medication Reconciliation: complete      NATALYA Vallejo      "

## 2020-10-13 NOTE — RESULT ENCOUNTER NOTE
John Lyons,    Attached are your test results.  -LDL(bad) cholesterol level is elevated which can increase your heart disease risk.  A diet high in fat and simple carbohydrates, genetics and being overweight can contribute to this. ADVISE: exercising 150 minutes of aerobic exercise per week (30 minutes for 5 days per week or 50 minutes for 3 days per week are options) and eating a low saturated fat/low carbohydrate diet are helpful to improve this. In 12 months, you should recheck your fasting cholesterol panel by scheduling a lab-only appointment.  -Kidney function is normal (Cr, GFR), Sodium is normal, Potassium is normal, Calcium is normal, Glucose is normal.    Please contact us if you have any questions.    Sarah Zapata, CNP

## 2020-10-14 ENCOUNTER — VIRTUAL VISIT (OUTPATIENT)
Dept: DERMATOLOGY | Facility: CLINIC | Age: 35
End: 2020-10-14
Attending: NURSE PRACTITIONER
Payer: COMMERCIAL

## 2020-10-14 DIAGNOSIS — L71.9 ROSACEA: ICD-10-CM

## 2020-10-14 PROCEDURE — 99202 OFFICE O/P NEW SF 15 MIN: CPT | Mod: 95 | Performed by: DERMATOLOGY

## 2020-10-14 ASSESSMENT — PAIN SCALES - GENERAL: PAINLEVEL: NO PAIN (0)

## 2020-10-14 NOTE — LETTER
10/14/2020         RE: Andre Lyons  [address]  [facility] [address]        Dear Colleague,    Thank you for referring your patient, Andre Lyons, to the [facility]. Please see a copy of my visit note below.    [facility] Dermatology Record:  Store and Forward and Telephone [phone]       Dermatology Problem List:  1. Rosacea, ET type  - Patient to consider PDL    Encounter Date: Oct 14, 2020    CC:   Chief Complaint   Patient presents with     Derm Problem     Facial concerns - broken capillaries and discoloration spot. Referred by Sarah Zapata.       History of Present Illness:  Andre Lyons is a 35 year old male who presents for broken blood vessels on the cheeks and nose.    He notes he has had these longer on the cheeks. Discoloration on the nose is more recent. He talked with his PCP who recommended derm referral. He is worried these could be a medical problem but also does not like the look of them. No significant sun exposure history. Works indoors now and has most of his life. No family history of similar spots.      ROS: Patient is generally feeling well today    Physical Examination:  General: Well-appearing male, appropriately-developed individual.  Skin: Focused examination including face was performed.   -Red telangiectasias around 1mm in diameter scattered on the malar cheeks, nose, and alar grooves.    Labs:  None    Past Medical History:   Patient Active Problem List   Diagnosis     Overweight (BMI 25.0-29.9)     No past medical history on file.  Past Surgical History:   Procedure Laterality Date     APPENDECTOMY  03/2010       Social History:  Patient reports that he has never smoked. He has never used smokeless tobacco. He reports that he does not drink alcohol or use drugs. Works for [employer].     Family History:  No rosacea history in family.    Medications:  No current outpatient medications on file.     No current facility-administered  "medications for this visit.           No Known Allergies        Impression and Recommendations (Patient Counseled on the Following):  1. Telangiectasias. Bilateral and symmetric - more like ET variant of rosacea than sun damage related for this reason. Regardless, reassured patient they are not harmful. They can effectively be treated with laser (PDL). Discussed expectations of laser treatments. Discussed that multiple sessions are needed for clearance, on average 3-6 sessions. Discussed associated costs. Patient will book something now and cancel if he decides not to proceed. Discussed that he cannot be tan before laser treatments and should schedule when he does not have any important life events for the following week.      Follow-up:   PRN     Staff only    Sarah De Luna MD    Department of Dermatology  [facility]: Phone: [phone], Fax:[phone]  [facility]: Phone: [phone], Fax: [phone]    _____________________________________________________________________________    Teledermatology information:  - Location of patient: [place]  - Patient presented as: provider referral  - Location of teledermatologist:  ([facility] )  - Reason teledermatology is appropriate:  of National Emergency Regarding Coronavirus disease (COVID 19) Outbreak  - Image quality and interpretability: acceptable  - Physician has received verbal consent for a Video/Photos Visit from the patient? YES  - In-person dermatology visit recommendation: no  - Date of images: 10/13/20  - Service start time: 10:33  - Service end time: 10:44  - Date of report: 10/14/2020         Teledermatology Nurse Call for NEW Patients (not seen in last 3 years):     The patient was contacted by phone and we reviewed they have a visit in teledermatology upcoming with an MD or CHAR;  Importantly, \"a " "teledermatology visit may not be as thorough as an in-person visit, and the quality of the photograph and/or video sent may not be of the same quality as that taken by the dermatology clinic.\"     This video visit will be conducted via a call between you and your physician/provider via [employer]. We have found that certain health care needs can be provided without the need for an in-person physical exam.  This service lets us provide the care you need with a video conversation.  If a prescription is necessary we can send it directly to your pharmacy.  If lab work is needed we can place an order for that and you can then stop by our lab to have the test done at a later time.If during the course of the call the physician/provider feels a video visit is not appropriate, you will not be charged for this service.Visits are billed at different rates depending on your insurance coverage. Please reach out to your insurance provider with any questions.    The patient will also send photographs via [employer] for review. Instructions for photography are/were sent to the patient via [employer] messaging.       The patient verified the location of the photo/video visit to be [place].(The physician must be notified by nurse if the patient is not in the state of [place] during the encounter)    The patient denied skin pain, fever, mucosal symptoms(lesions, blisters, sores in the mouth, nose, eyes, or genitals)  IF PATIENT ENDORSES ANY OF THESE STOP AND PAGE ON CALL ATTENDING    Additional notes:  Patient summary of issue: Broken facial capillaries and discoloration spot on right side nostril    Location of problem on body:   1.Cheeks;  3+ years  2. R nostril; 1 year   How long has area/symptoms been present:   What makes it better?:N/A   What makes it worse?:N/A   Other symptoms include the following: No symptoms   Which medications have been tried, for how long, and did they make it better or worse (ex. Triamcinolone, used twice daily for 2 " weeks, not improved): No past or current tx's.   The patient has not seen a dermatologist.   The patient hasno past medical history of skin cancer. Father hx of BCC per pt.   ROS: The patient is generally feeling well.                       Again, thank you for allowing me to participate in the care of your patient.        Sincerely,        Sarah De Luna MD

## 2020-10-14 NOTE — PROGRESS NOTES
[facility] Dermatology Record:  Store and Forward and Telephone [phone]       Dermatology Problem List:  1. Rosacea, ET type  - Patient to consider PDL    Encounter Date: Oct 14, 2020    CC:   Chief Complaint   Patient presents with     Derm Problem     Facial concerns - broken capillaries and discoloration spot. Referred by Sarah Zapata.       History of Present Illness:  Andre Lyons is a 35 year old male who presents for broken blood vessels on the cheeks and nose.    He notes he has had these longer on the cheeks. Discoloration on the nose is more recent. He talked with his PCP who recommended derm referral. He is worried these could be a medical problem but also does not like the look of them. No significant sun exposure history. Works indoors now and has most of his life. No family history of similar spots.      ROS: Patient is generally feeling well today    Physical Examination:  General: Well-appearing male, appropriately-developed individual.  Skin: Focused examination including face was performed.   -Red telangiectasias around 1mm in diameter scattered on the malar cheeks, nose, and alar grooves.    Labs:  None    Past Medical History:   Patient Active Problem List   Diagnosis     Overweight (BMI 25.0-29.9)     No past medical history on file.  Past Surgical History:   Procedure Laterality Date     APPENDECTOMY  03/2010       Social History:  Patient reports that he has never smoked. He has never used smokeless tobacco. He reports that he does not drink alcohol or use drugs. Works for [employer].     Family History:  No rosacea history in family.    Medications:  No current outpatient medications on file.     No current facility-administered medications for this visit.           No Known Allergies        Impression and Recommendations (Patient Counseled on the Following):  1. Telangiectasias. Bilateral and symmetric - more like ET variant of rosacea than sun damage related for this reason.  "Regardless, reassured patient they are not harmful. They can effectively be treated with laser (PDL). Discussed expectations of laser treatments. Discussed that multiple sessions are needed for clearance, on average 3-6 sessions. Discussed associated costs. Patient will book something now and cancel if he decides not to proceed. Discussed that he cannot be tan before laser treatments and should schedule when he does not have any important life events for the following week.      Follow-up:   PRN     Staff only    Sarah De Luna MD    Department of Dermatology  [facility]: Phone: [phone], Fax:[phone]  [facility]: Phone: [phone], Fax: [phone]    _____________________________________________________________________________    Teledermatology information:  - Location of patient: [place]  - Patient presented as: provider referral  - Location of teledermatologist:  [facility] )  - Reason teledermatology is appropriate:  of National Emergency Regarding Coronavirus disease (COVID 19) Outbreak  - Image quality and interpretability: acceptable  - Physician has received verbal consent for a Video/Photos Visit from the patient? YES  - In-person dermatology visit recommendation: no  - Date of images: 10/13/20  - Service start time: 10:33  - Service end time: 10:44  - Date of report: 10/14/2020         Teledermatology Nurse Call for NEW Patients (not seen in last 3 years):     The patient was contacted by phone and we reviewed they have a visit in teledermatology upcoming with an MD or [place];  Importantly, \"a teledermatology visit may not be as thorough as an in-person visit, and the quality of the photograph and/or video sent may not be of the same quality as that taken by the dermatology clinic.\"     This video visit will be conducted via a call between " you and your physician/provider via [employer]. We have found that certain health care needs can be provided without the need for an in-person physical exam.  This service lets us provide the care you need with a video conversation.  If a prescription is necessary we can send it directly to your pharmacy.  If lab work is needed we can place an order for that and you can then stop by our lab to have the test done at a later time.If during the course of the call the physician/provider feels a video visit is not appropriate, you will not be charged for this service.Visits are billed at different rates depending on your insurance coverage. Please reach out to your insurance provider with any questions.    The patient will also send photographs via [employer] for review. Instructions for photography are/were sent to the patient via [employer] messaging.       The patient verified the location of the photo/video visit to be [place].(The physician must be notified by nurse if the patient is not in the state of [place] during the encounter)    The patient denied skin pain, fever, mucosal symptoms(lesions, blisters, sores in the mouth, nose, eyes, or genitals)  IF PATIENT ENDORSES ANY OF THESE STOP AND PAGE ON CALL ATTENDING    Additional notes:  Patient summary of issue: Broken facial capillaries and discoloration spot on right side nostril    Location of problem on body:   1.Cheeks;  3+ years  2. R nostril; 1 year   How long has area/symptoms been present:   What makes it better?:N/A   What makes it worse?:N/A   Other symptoms include the following: No symptoms   Which medications have been tried, for how long, and did they make it better or worse (ex. Triamcinolone, used twice daily for 2 weeks, not improved): No past or current tx's.   The patient has not seen a dermatologist.   The patient hasno past medical history of skin cancer. Father hx of BCC per pt.   ROS: The patient is generally feeling well.

## 2020-10-14 NOTE — PATIENT INSTRUCTIONS
[facility] Dermatology Visit    Thank you for allowing us to participate in your care. Your findings, instructions and follow-up plan are as follows:    Telangiectasias likely due to rosacea  - Laser treatments with pulse dye laser are best for this  - Plan for at least 3 sessions of treatments spaced at least one month apart (about half of patients are happy with results after 3 sessions and do not elect for more)  - We will call you to schedule      When should I call my doctor?    If you are worsening or not improving, please, contact us or seek urgent care as noted below.     Who should I call with questions (adults)?    [facility] (adult and pediatric): [phone]     [facility] (adult): [phone]    For urgent needs outside of business hours call the [facility] at [phone] and ask for the dermatology resident on call    If this is a medical emergency and you are unable to reach an ER, Call [phone]      Who should I call with questions (pediatric)?  [facility]- Pediatric Dermatology  Dr. Jessica Mart, Dr. Oscar Shine, Dr. Jeannette Flores, Hannah Reyna, PA  Dr. Vickie Anne, Dr. Cony Clarke & Dr. Maximo Hay  Non Urgent  Nurse Triage Line; [phone]- Nneka and Leonarda CLAUDIO Care Coordinatornichelle Ruiz (/Complex ) [phone]    If you need a prescription refill, please contact your pharmacy. Refills are approved or denied by our Physicians during normal business hours, Monday through Fridays  Per office policy, refills will not be granted if you have not been seen within the past year (or sooner depending on your child's condition)    Scheduling Information:  Pediatric Appointment Scheduling and Call Center [phone]  Radiology Scheduling- [phone]  Sedation Unit Scheduling- [phone]  [place] Scheduling- General [phone]; Pediatric  Dermatology [phone]  Main  Services: [phone]  Wolof: [phone]  Bahamian: [phone]  Hmong/German/Bulgarian: [phone]  Preadmission Nursing Department Fax Number: [phone] (Fax all pre-operative paperwork to this number)    For urgent matters arising during evenings, weekends, or holidays that cannot wait for normal business hours please call [phone] and ask for the Dermatology Resident On-Call to be paged.

## 2020-10-14 NOTE — Clinical Note
Please schedule patient for PDL of the cheeks and nose whenever available. He understands I may be booked a few months out. He prefers the  location.    Thanks,  LF

## 2021-02-23 ENCOUNTER — TELEPHONE (OUTPATIENT)
Dept: DERMATOLOGY | Facility: CLINIC | Age: 36
End: 2021-02-23

## 2021-03-18 ENCOUNTER — OFFICE VISIT (OUTPATIENT)
Dept: DERMATOLOGY | Facility: CLINIC | Age: 36
End: 2021-03-18
Payer: COMMERCIAL

## 2021-03-18 DIAGNOSIS — I78.1 TELANGIECTASIA: Primary | ICD-10-CM

## 2021-03-18 PROCEDURE — 96999 UNLISTED SPEC DERM SVC/PX: CPT | Performed by: DERMATOLOGY

## 2021-03-18 ASSESSMENT — PAIN SCALES - GENERAL: PAINLEVEL: NO PAIN (0)

## 2021-03-18 NOTE — LETTER
3/18/2021         RE: Andre Lyons  [address]  [place] [address]        Dear Colleague,    Thank you for referring your patient, Andre Lyons, to the [facility]. Please see a copy of my visit note below.    See procedure note.      Again, thank you for allowing me to participate in the care of your patient.        Sincerely,        Sarah De Luna MD

## 2021-03-18 NOTE — PROCEDURES
Laser- VBeam(Pulsed Dye Laser) Procedure Note: Cosmetic      Dermatology Problem List:  1. Rosacea, ET type  - S/p PDL      Procedure Date: Mar 18, 2021    Attending Staff Surgeon: Dr. De Luna    Resident Surgeon: CLINT    Assistant: Merry    Operating Room Data:     Surgery/Procedure Date:    SAME     Pre-operative Diagnosis:   Telangectasias  Location: cheeks and nose  Areas: 3    Operation/Procedure    Vbeam pulsed dye laser treatment#: 1       Post-operative Diagnosis:  SAME    Laser Settings:  Energy:7 J/cm2  Spot size:7mm  Pulse width:  6 mS (0.45 thru 40 mS)  Dynamic cooling spray settin mS  Dynamic cooling device delay:  30 mS      Fotofinder photos: No    Anesthesia:  None    Description of Operation/Procedure:   The nature and purpose of the procedure, associated risks, possible consequences, complications and alternative methods of treatment were explained in detail, this includes but is not limited to hyperpigmentation, hypopigmentation, scarring, bruising, hair loss pain/discomfort, eye injury, hair loss,  and blister. We reviewed that the outcome could be any of the following: no improvement, slight improvement or change in skin color & texture, the skin might be permanently lighter or darker, and though uncommon, superficial scarring may occur.  Multiple treatments will be needed.    A photo and operative consent were obtained. Time-out was performed.The patient was positioned to optimally expose the area treated. Protective eyewear was worn by the patient and goggles on all personnel in the treatment room. The patient confirmed the site to be treated.    The clinically evident lesion(s) was/were treated with Ana M Vbeam pulsed dye laser (595 nm) beam as above. A total of 321 pulses were used. The patient tolerated the procedure well and no complications were noted. Post operative instructions were provided.     The patient was counseled to call immediately for any issues and read the after  visit summary for emergency contact information. The patient was counseled that mychart messaging response may be delayed by several days, therefore, phone is preferred for emergency issues.     The patient will follow-up in 4 weeks or more for next treatment.    The patient will pay the cosmetic fee today (PDL 3 areas).       Staff Involved:  Staff Only    I was present for ascertainment of protective equipment utilization,, calibration of laser, and the entire procedure.    Sarah De Luna MD    Department of Dermatology  [facility]: Phone: [phone], Fax:[phone]  [facility]: Phone: [phone], Fax: [phone]

## 2021-03-18 NOTE — NURSING NOTE
Andre Lyons's goals for this visit include:   Chief Complaint   Patient presents with     Laser Treatment     PDL #1 cheeks and nose     He requests these members of his care team be copied on today's visit information:     PCP: No Ref-Primary, Physician    Referring Provider:  No referring provider defined for this encounter.    There were no vitals taken for this visit.    Do you need any medication refills at today's visit? No    Dermatology Laser Intake Checklist:  History of psoriasis:No  History of recent tan, indoor or outdoor tanning/vacation or other sun exposure: No  History of vitiligo:No  Family history of vitiligo:No  Recent other cosmetic procedure(microderm abrasion/peel/hair removal/facial etc):No  History of HSV:No   Did the patient start valtrex: No  For genital laser hair removal patient only: Is there a history of genital warts or condyloma:NA  Tattoo in the area to be treated:No  Is patient using hydroquinone:No  Retinoids and other acne medications stopped for 2 weeks:NA  Has the patient had accutane in the last 6-12 months:No  Pregnant or breastfeeding: No  History of skin cancer in area planned for treatment: No  History of treatment with gold:No  Changes in medical history: No  Photos obtained: Yes  Does the patient smoke:No  Is the patient on ibuprofen/aspirin/plavix/coumadin/other blood thinner: No  If patient is taking narcotic or diazepam(valium)-does patient have : No  There were no vitals taken for this visit.    Merry Green LPN

## 2021-03-18 NOTE — PATIENT INSTRUCTIONS
Pulse Dye Laser    I will experience redness, swelling, pain, and heat sensation. I may experience bruising, itching, or acne. Risks are blistering, oozing, permanent scarring, hair loss,  temporary or permanent skin lightening or darkening, infection, and eye injury. I understand my outcome could be no improvement, slight improvement. Multiple treatments may be required.    After treatment, Do Not:    Rub, scratch, or put weight on the site for 2 weeks    Wear tight fitting clothing or jewelry over the site    Armendariz. Keep the site out of sunlight. Use sunscreen of 30 SPF or greater when in the sun. Use sunscreen 30 minutes before going out and reapply if sweating. Tanning decreases the success of the treatment     How do I care for the treated site?    Use ice packs for 10 minutes after the procedure for swelling     If the site is on your face, use ice again 1 hour after treatment for ten minutes and repeat again before bed. Do not burn the skin with the ice.     If a scab or crust forms, gently cleanse the site with water. Then put on Vaseline  ointment 3 times a day and contact the clinic     If a blister forms, contact the clinic    If you have concerns about swelling, call the clinic    Do not use makeup on any open wound    What should I expect?    Mild swelling    Blue-purple color that may take 2 to 3 weeks to go away    Redness may also last a week or longer    Results may take up to 3 or 4 months after treatment    More procedures may be needed    Who should I call with questions?    [facility]: [phone]     [facility]: [phone]    For urgent needs outside of business hours call the [facility] at [phone] and ask for the dermatology resident on call    [employer] messaging response may be delayed

## 2021-04-22 ENCOUNTER — OFFICE VISIT (OUTPATIENT)
Dept: DERMATOLOGY | Facility: CLINIC | Age: 36
End: 2021-04-22
Payer: COMMERCIAL

## 2021-04-22 ENCOUNTER — TELEPHONE (OUTPATIENT)
Dept: DERMATOLOGY | Facility: CLINIC | Age: 36
End: 2021-04-22

## 2021-04-22 DIAGNOSIS — I78.1 TELANGIECTASIA: ICD-10-CM

## 2021-04-22 DIAGNOSIS — L71.9 ROSACEA: Primary | ICD-10-CM

## 2021-04-22 PROCEDURE — 96999 UNLISTED SPEC DERM SVC/PX: CPT | Performed by: DERMATOLOGY

## 2021-04-22 NOTE — LETTER
4/22/2021         RE: Andre Lyons  [address]  [place] [address]        Dear Colleague,    Thank you for referring your patient, Andre Lyons, to the [facility]. Please see a copy of my visit note below.    See procedure note.      Again, thank you for allowing me to participate in the care of your patient.        Sincerely,        Sarah De Luna MD

## 2021-04-22 NOTE — PATIENT INSTRUCTIONS
[facility] Treatment Instructions:    I will have heat sensation, erythema, temporary hair loss, swelling, pain. I may have a bruise. The risks are burn, scar, blistering, and skin discoloration.    My outcome could be any of the following: no improvement, slight improvement and multiple treatments are required.    Day1-7:  The healing time for any given treatment varies between different clients. The following represents and general recovery phases you might expect. Individual clients may experience variation in this treatment course.     [place] of your wrinkles occur immedidately. You may have under eye swelling for the first day, but this swelling will subside. There will be redness and very minimal warm emanating from the area for the first 3 hours. You should notify us if you experience pain that is severe or excessive as this is unusual.     Bruising is not all at all common, but let us know if this occurs.     Activity:  Post redness is mild and you may return to regular activities immediately. Do not apply ice or cooling compresses as the heat response is the body's natural healing response.     Moisturizer:  Moisturizer may be applied immediately after each treatment and then should be applied regularly throughout the course of the treatment.     Make-up:  Can be applied immediately after the treatments. It is important that you remove all make up that is applied to the skin at night. Do not sleep with make up on the treated area    Swelling/Redness/Discomfort:  You should notify us if you experience pain that is severe or excessive as this is unusual    Sun Avoidance:  Sun avoidance should become a permanent component of your long-term skin care program. Always use an SPF 30 or greater even on the cloudy day.       Who should I call with questions?    [facility]: [phone]     [facility]: [phone]    For urgent needs outside of  business hours call the [facility] at [phone] and ask for the dermatology resident on call

## 2021-04-22 NOTE — NURSING NOTE
Andre Lyons's goals for this visit include:   Chief Complaint   Patient presents with     Procedure     PDL nose and cheeks       He requests these members of his care team be copied on today's visit information: no    PCP: No Ref-Primary, Physician    Referring Provider:  No referring provider defined for this encounter.    There were no vitals taken for this visit.    Do you need any medication refills at today's visit? No    Dermatology Laser Intake Checklist:  History of psoriasis:No  History of recent tan, indoor or outdoor tanning/vacation or other sun exposure: No  History of vitiligo:No  Family history of vitiligo:No  Recent other cosmetic procedure(microderm abrasion/peel/hair removal/facial etc):No  History of HSV:No   Did the patient start valtrex: No  For genital laser hair removal patient only: Is there a history of genital warts or condyloma:No  Tattoo in the area to be treated:No  Is patient using hydroquinone:No  Retinoids and other acne medications stopped for 2 weeks:No  Has the patient had accutane in the last 6-12 months:No  Pregnant or breastfeeding: No  History of skin cancer in area planned for treatment: No  History of treatment with gold:No  Changes in medical history: No  Photos obtained: Yes  Does the patient smoke:No  Is the patient on ibuprofen/aspirin/plavix/coumadin/other blood thinner: No  If patient is taking narcotic or diazepam(valium)-does patient have : No  There were no vitals taken for this visit.    Jania Powell LPN

## 2021-04-22 NOTE — PROCEDURES
Laser- VBeam(Pulsed Dye Laser) Procedure Note: Cosmetic      Dermatology Problem List:  1. Rosacea, ET type  - S/p PDL      Procedure Date: 2021    Attending Staff Surgeon: Dr. De Luna    Resident Surgeon: CLINT    Assistant: Jania    Operating Room Data:     Surgery/Procedure Date:    SAME     Pre-operative Diagnosis:   Telangectasias  Location: cheeks and nose  Areas: 3    Operation/Procedure    Vbeam pulsed dye laser treatment#: 2       Post-operative Diagnosis:  SAME    Laser Settings:  Energy:7 J/cm2  Spot size: 7mm lower eyelids, 10mm elsewhere  Pulse width:  6 mS (0.45 thru 40 mS)  Dynamic cooling spray settin mS  Dynamic cooling device delay:  20 mS      Fotofinder photos: No    Anesthesia:  None    Description of Operation/Procedure:   The nature and purpose of the procedure, associated risks, possible consequences, complications and alternative methods of treatment were explained in detail, this includes but is not limited to hyperpigmentation, hypopigmentation, scarring, bruising, hair loss pain/discomfort, eye injury, hair loss,  and blister. We reviewed that the outcome could be any of the following: no improvement, slight improvement or change in skin color & texture, the skin might be permanently lighter or darker, and though uncommon, superficial scarring may occur.  Multiple treatments may be recommended.     A photo and operative consent were obtained. Time-out was performed.The patient was positioned to optimally expose the area treated. Protective eyewear was worn by the patient and goggles on all personnel in the treatment room. The patient confirmed the site to be treated.     The clinically evident lesion(s) was/were treated with Ana M Vbeam pulsed dye laser (595 nm) beam as above. A total of 223 pulses were used. The patient tolerated the procedure well and no complications were noted. Post operative instructions were provided.     The patient was counseled to call  immediately for any issues and read the after visit summary for emergency contact information. The patient was counseled that mychart messaging response may be delayed by several days, therefore, phone is preferred for emergency issues.     The patient will follow-up in 4 or more weeks.    The patient will pay the cosmetic fee today (PDL 3 areas).     Staff Involved:  Staff Only    Sarah De Luna MD    Department of Dermatology  [facility]: Phone: [phone], Fax:[phone]  [facility]: Phone: [phone], Fax: [phone]

## 2021-04-22 NOTE — TELEPHONE ENCOUNTER
"Patient called writer back.  This nurse placed a courtesy follow up phone call to patient regarding his recent laser treatment that was completed today in  Dermatology Clinic.  Today, Dr. De Luna noted bruising from the [facility] Laser treatment.  This nurse following up with patient to discuss bruising, if tolerating okay and to review that bruising has been reported by other patient's regarding the Prima Laser.  Patient stated that he noted a small bruise to the left side of his nose and states \"I had a small bruise to the right side of my nose after my previous laser and all was fine\".  He denies pain.  This nurse reviewed with patient to continue to monitor bruising and asked that he photograph the bruising and send via [facility] for Dr. De Luna to review.  Encouraged patient to notify his dermatology team if the bruising worsens or if he notices any blisters at the laser site.  Patient verbalized understanding and very appreciative of the call.  He will notify team with any concerns.  Connie Lynch RN on 4/22/2021 at 1:13 PM    "

## 2021-05-20 ENCOUNTER — OFFICE VISIT (OUTPATIENT)
Dept: DERMATOLOGY | Facility: CLINIC | Age: 36
End: 2021-05-20
Payer: COMMERCIAL

## 2021-05-20 DIAGNOSIS — L71.9 ROSACEA: Primary | ICD-10-CM

## 2021-05-20 PROCEDURE — 96999 UNLISTED SPEC DERM SVC/PX: CPT | Performed by: DERMATOLOGY

## 2021-05-20 ASSESSMENT — PAIN SCALES - GENERAL: PAINLEVEL: NO PAIN (0)

## 2021-05-20 NOTE — PROCEDURES
Laser- VBeam(Pulsed Dye Laser) Procedure Note: Cosmetic      Dermatology Problem List:  1. Rosacea, ET type  - S/p PDL      Procedure Date: May 20, 2021    Attending Staff Surgeon: Dr. De Luna     Resident Surgeon: CLINT    Assistant: Rupa Lewis LPN    Operating Room Data:     Surgery/Procedure Date:    SAME     Pre-operative Diagnosis:   Rosacea  Location: cheeks and nose  Areas: 3    Operation/Procedure    Vbeam pulsed dye laser treatment#: 3       Post-operative Diagnosis:  SAME    Laser Settings:  Energy:7 J/cm2  Spot size: 7mm lower eyelids, 10mm elsewhere  Pulse width:  6 mS (0.45 thru 40 mS)  Dynamic cooling spray settin mS  Dynamic cooling device delay:  20 mS      Fotofinder photos: No    Anesthesia:  None    Description of Operation/Procedure:   The nature and purpose of the procedure, associated risks, possible consequences, complications and alternative methods of treatment were explained in detail, this includes but is not limited to hyperpigmentation, hypopigmentation, scarring, bruising, hair loss pain/discomfort, eye injury, and blister. We reviewed that the outcome could be any of the following: no improvement, slight improvement or change in skin color & texture, the skin might be permanently lighter or darker, and though uncommon, superficial scarring may occur.  Multiple treatments may be recommended.     A photo and operative consent were obtained. Time-out was performed.The patient was positioned to optimally expose the area treated. Protective eyewear was worn by the patient and goggles on all personnel in the treatment room. The patient confirmed the site to be treated.     The clinically evident lesion(s) was/were treated with Ana M Vbeam pulsed dye laser (595 nm) beam as above. A total of 277 pulses were used. The patient tolerated the procedure well and no complications were noted. Post operative instructions were provided. The patient was counseled to call immediately for any  issues and read the after visit summary for emergency contact information. The patient was counseled that mychart messaging response may be delayed by several days, therefore, phone is preferred for emergency issues.     The patient will follow-up in 4 or more weeks for next treatment if desired.    The patient will pay the cosmetic fee today (PDL 3 areas).     Staff Involved:  Staff Only    Sarah De Luna MD    Department of Dermatology  [facility]: Phone: [phone], Fax:[phone]  [facility]: Phone: [phone], Fax: [phone]

## 2021-05-20 NOTE — LETTER
5/20/2021         RE: Andre Lyons  [address]  [place] [address]        Dear Colleague,    Thank you for referring your patient, Andre Lyons, to the [facility]. Please see a copy of my visit note below.    See procedure note.    Andre Lyons's goals for this visit include:   Chief Complaint   Patient presents with     Laser Treatment     PDL#3       He requests these members of his care team be copied on today's visit information: no    PCP: No Ref-Primary, Physician    Referring Provider:  No referring provider defined for this encounter.    There were no vitals taken for this visit.    Do you need any medication refills at today's visit? No    Dermatology Laser Intake Checklist:  History of psoriasis:No  History of recent tan, indoor or outdoor tanning/vacation or other sun exposure: No  History of vitiligo:No  Family history of vitiligo:No  Recent other cosmetic procedure(microderm abrasion/peel/hair removal/facial etc):No  History of HSV:No   Did the patient start valtrex: No  For genital laser hair removal patient only: Is there a history of genital warts or condyloma:No  Tattoo in the area to be treated:No  Is patient using hydroquinone:No  Retinoids and other acne medications stopped for 2 weeks:No  Has the patient had accutane in the last 6-12 months:No  Pregnant or breastfeeding: No  History of skin cancer in area planned for treatment: No  History of treatment with gold:No  Changes in medical history: No  Photos obtained: Yes  Does the patient smoke:No  Is the patient on ibuprofen/aspirin/plavix/coumadin/other blood thinner: No  If patient is taking narcotic or diazepam(valium)-does patient have : No  There were no vitals taken for this visit.    Rupa Lewis LPN                Again, thank you for allowing me to participate in the care of your patient.        Sincerely,        Sarah De Luna MD

## 2021-05-20 NOTE — PROGRESS NOTES
Andre Lyons's goals for this visit include:   Chief Complaint   Patient presents with     Laser Treatment     PDL#3       He requests these members of his care team be copied on today's visit information: no    PCP: No Ref-Primary, Physician    Referring Provider:  No referring provider defined for this encounter.    There were no vitals taken for this visit.    Do you need any medication refills at today's visit? No    Dermatology Laser Intake Checklist:  History of psoriasis:No  History of recent tan, indoor or outdoor tanning/vacation or other sun exposure: No  History of vitiligo:No  Family history of vitiligo:No  Recent other cosmetic procedure(microderm abrasion/peel/hair removal/facial etc):No  History of HSV:No   Did the patient start valtrex: No  For genital laser hair removal patient only: Is there a history of genital warts or condyloma:No  Tattoo in the area to be treated:No  Is patient using hydroquinone:No  Retinoids and other acne medications stopped for 2 weeks:No  Has the patient had accutane in the last 6-12 months:No  Pregnant or breastfeeding: No  History of skin cancer in area planned for treatment: No  History of treatment with gold:No  Changes in medical history: No  Photos obtained: Yes  Does the patient smoke:No  Is the patient on ibuprofen/aspirin/plavix/coumadin/other blood thinner: No  If patient is taking narcotic or diazepam(valium)-does patient have : No  There were no vitals taken for this visit.    Rupa Lewis LPN

## 2021-05-20 NOTE — PATIENT INSTRUCTIONS
[facility]    I will experience redness, swelling, pain, and heat sensation. I may experience bruising, itching, or acne. Risks are blistering, oozing, permanent scarring, hair loss,  temporary or permanent skin lightening or darkening, infection, and eye injury. I understand my outcome could be no improvement, slight improvement. Multiple treatments may be required.    After treatment, Do Not:    Rub, scratch, or put weight on the site for 2 weeks    Wear tight fitting clothing or jewelry over the site    Armendariz. Keep the site out of sunlight. Use sunscreen of 30 SPF or greater when in the sun. Use sunscreen 30 minutes before going out and reapply if sweating. Tanning decreases the success of the treatment     How do I care for the treated site?    Use ice packs for 10 minutes after the procedure for swelling     If the site is on your face, use ice again 1 hour after treatment for ten minutes and repeat again before bed. Do not burn the skin with the ice.     If a scab or crust forms, gently cleanse the site with water. Then put on Vaseline  ointment 3 times a day and contact the clinic     If a blister forms, contact the clinic    If you have concerns about swelling, call the clinic    Do not use makeup on any open wound    What should I expect?    Mild swelling    Blue-purple color that may take 2 to 3 weeks to go away    Redness may also last a week or longer    Results may take up to 3 or 4 months after treatment    More procedures may be needed    Who should I call with questions?    [facility]: [phone]     [facility]: [phone]    For urgent needs outside of business hours call the [facility] at [phone] and ask for the dermatology resident on call    [employer] messaging response may be delayed

## 2021-10-09 ENCOUNTER — HEALTH MAINTENANCE LETTER (OUTPATIENT)
Age: 36
End: 2021-10-09

## 2021-12-04 ENCOUNTER — HEALTH MAINTENANCE LETTER (OUTPATIENT)
Age: 36
End: 2021-12-04

## 2022-09-11 ENCOUNTER — HEALTH MAINTENANCE LETTER (OUTPATIENT)
Age: 37
End: 2022-09-11

## 2023-01-23 ENCOUNTER — HEALTH MAINTENANCE LETTER (OUTPATIENT)
Age: 38
End: 2023-01-23

## 2024-02-24 ENCOUNTER — HEALTH MAINTENANCE LETTER (OUTPATIENT)
Age: 39
End: 2024-02-24

## 2024-03-03 SDOH — HEALTH STABILITY: PHYSICAL HEALTH: ON AVERAGE, HOW MANY MINUTES DO YOU ENGAGE IN EXERCISE AT THIS LEVEL?: 60 MIN

## 2024-03-03 SDOH — HEALTH STABILITY: PHYSICAL HEALTH: ON AVERAGE, HOW MANY DAYS PER WEEK DO YOU ENGAGE IN MODERATE TO STRENUOUS EXERCISE (LIKE A BRISK WALK)?: 6 DAYS

## 2024-03-03 ASSESSMENT — SOCIAL DETERMINANTS OF HEALTH (SDOH): HOW OFTEN DO YOU GET TOGETHER WITH FRIENDS OR RELATIVES?: PATIENT DECLINED

## 2024-03-04 ENCOUNTER — OFFICE VISIT (OUTPATIENT)
Dept: FAMILY MEDICINE | Facility: CLINIC | Age: 39
End: 2024-03-04
Payer: COMMERCIAL

## 2024-03-04 VITALS
HEART RATE: 64 BPM | HEIGHT: 70 IN | DIASTOLIC BLOOD PRESSURE: 86 MMHG | TEMPERATURE: 97.9 F | WEIGHT: 229.44 LBS | OXYGEN SATURATION: 96 % | SYSTOLIC BLOOD PRESSURE: 128 MMHG | RESPIRATION RATE: 16 BRPM | BODY MASS INDEX: 32.85 KG/M2

## 2024-03-04 DIAGNOSIS — Z11.4 SCREENING FOR HIV (HUMAN IMMUNODEFICIENCY VIRUS): ICD-10-CM

## 2024-03-04 DIAGNOSIS — Z11.59 NEED FOR HEPATITIS C SCREENING TEST: ICD-10-CM

## 2024-03-04 DIAGNOSIS — Z13.220 SCREENING FOR HYPERLIPIDEMIA: ICD-10-CM

## 2024-03-04 DIAGNOSIS — Z00.00 ROUTINE GENERAL MEDICAL EXAMINATION AT A HEALTH CARE FACILITY: Primary | ICD-10-CM

## 2024-03-04 DIAGNOSIS — Z13.1 SCREENING FOR DIABETES MELLITUS: ICD-10-CM

## 2024-03-04 LAB
ANION GAP SERPL CALCULATED.3IONS-SCNC: 10 MMOL/L (ref 7–15)
BUN SERPL-MCNC: 17.6 MG/DL (ref 6–20)
CALCIUM SERPL-MCNC: 9.5 MG/DL (ref 8.6–10)
CHLORIDE SERPL-SCNC: 101 MMOL/L (ref 98–107)
CHOLEST SERPL-MCNC: 275 MG/DL
CREAT SERPL-MCNC: 1.05 MG/DL (ref 0.67–1.17)
DEPRECATED HCO3 PLAS-SCNC: 27 MMOL/L (ref 22–29)
EGFRCR SERPLBLD CKD-EPI 2021: >90 ML/MIN/1.73M2
FASTING STATUS PATIENT QL REPORTED: YES
GLUCOSE SERPL-MCNC: 108 MG/DL (ref 70–99)
HBA1C MFR BLD: 5.5 % (ref 0–5.6)
HDLC SERPL-MCNC: 62 MG/DL
HIV 1+2 AB+HIV1 P24 AG SERPL QL IA: NONREACTIVE
LDLC SERPL CALC-MCNC: 175 MG/DL
NONHDLC SERPL-MCNC: 213 MG/DL
POTASSIUM SERPL-SCNC: 4.7 MMOL/L (ref 3.4–5.3)
SODIUM SERPL-SCNC: 138 MMOL/L (ref 135–145)
TRIGL SERPL-MCNC: 188 MG/DL

## 2024-03-04 PROCEDURE — 87389 HIV-1 AG W/HIV-1&-2 AB AG IA: CPT | Performed by: INTERNAL MEDICINE

## 2024-03-04 PROCEDURE — 83036 HEMOGLOBIN GLYCOSYLATED A1C: CPT | Performed by: INTERNAL MEDICINE

## 2024-03-04 PROCEDURE — 36415 COLL VENOUS BLD VENIPUNCTURE: CPT | Performed by: INTERNAL MEDICINE

## 2024-03-04 PROCEDURE — 99386 PREV VISIT NEW AGE 40-64: CPT | Performed by: INTERNAL MEDICINE

## 2024-03-04 PROCEDURE — 80061 LIPID PANEL: CPT | Performed by: INTERNAL MEDICINE

## 2024-03-04 PROCEDURE — 80048 BASIC METABOLIC PNL TOTAL CA: CPT | Performed by: INTERNAL MEDICINE

## 2024-03-04 PROCEDURE — 86803 HEPATITIS C AB TEST: CPT | Performed by: INTERNAL MEDICINE

## 2024-03-04 ASSESSMENT — PAIN SCALES - GENERAL: PAINLEVEL: NO PAIN (0)

## 2024-03-04 NOTE — PROGRESS NOTES
"The 10-year ASCVD risk score (Dex BARRERA, et al., 2019) is: 0.9%    Values used to calculate the score:      Age: 38 years      Sex: Male      Is Non- : No      Diabetic: No      Tobacco smoker: No      Systolic Blood Pressure: 128 mmHg      Is BP treated: No      HDL Cholesterol: 57 mg/dL      Total Cholesterol: 198 mg/dL     Preventive Care Visit  [facility]  Gregg Wasserman MD, Internal Medicine  Mar 4, 2024    Assessment & Plan     Routine general medical examination at a health care facility  History of any CAD or CVA  He does have family history of diabetes  No family history for colon cancer or prostate cancer  We discussed about hepatitis B vaccination today and also about flu and COVID vaccinations  He exercises regularly  - Basic metabolic panel  (Ca, Cl, CO2, Creat, Gluc, K, Na, BUN); Future  - Basic metabolic panel  (Ca, Cl, CO2, Creat, Gluc, K, Na, BUN)    Screening for HIV (human immunodeficiency virus)    - HIV Antigen Antibody Combo; Future  - HIV Antigen Antibody Combo    Need for hepatitis C screening test    - Hepatitis C Screen Reflex to HCV RNA Quant and Genotype; Future  - Hepatitis C Screen Reflex to HCV RNA Quant and Genotype    Screening for hyperlipidemia    - Lipid panel reflex to direct LDL Fasting; Future  - Lipid panel reflex to direct LDL Fasting    Screening for diabetes mellitus    - Hemoglobin A1c; Future  - Hemoglobin A1c        30 minutes spent by me on the date of the encounter doing chart review, history and exam, documentation and further activities per the note      BMI  Estimated body mass index is 33.16 kg/m  as calculated from the following:    Height as of this encounter: 1.772 m (5' 9.75\").    Weight as of this encounter: 104.1 kg (229 lb 7 oz).   Weight management plan: Discussed healthy diet and exercise guidelines    Counseling  Appropriate preventive services were discussed with this patient, including applicable " screening as appropriate for fall prevention, nutrition, physical activity, Tobacco-use cessation, weight loss and cognition.  Checklist reviewing preventive services available has been given to the patient.  Reviewed patient's diet, addressing concerns and/or questions.           Ismael Powell is a 38 year old, presenting for the following:  Physical        3/4/2024     6:53 AM   Additional Questions   Roomed by Mary ANDINO   Accompanied by self         3/4/2024     6:53 AM   Patient Reported Additional Medications   Patient reports taking the following new medications None        Health Care Directive  Patient does not have a Health Care Directive or Living Will: Discussed advance care planning with patient; however, patient declined at this time.    Healthy Habits:     Getting at least 3 servings of Calcium per day:  Yes    Bi-annual eye exam:  Yes    Dental care twice a year:  Yes    Sleep apnea or symptoms of sleep apnea:  None    Diet:  Regular (no restrictions)    Frequency of exercise:  6-7 days/week    Duration of exercise:  45-60 minutes    Taking medications regularly:  No    Barriers to taking medications:  Not applicable    Medication side effects:  Not applicable    Additional concerns today:  No                3/3/2024   General Health   How would you rate your overall physical health? Good   Feel stress (tense, anxious, or unable to sleep) To some extent   (!) STRESS CONCERN      3/3/2024   Nutrition   Three or more servings of calcium each day? Yes   Diet: Regular (no restrictions)   How many servings of fruit and vegetables per day? (!) 2-3   How many sweetened beverages each day? 0-1         3/3/2024   Exercise   Days per week of moderate/strenous exercise 6 days   Average minutes spent exercising at this level 60 min         3/3/2024   Social Factors   Frequency of gathering with friends or relatives Patient declined   Worry food won't last until get money to buy more No   Food not last or  not have enough money for food? No   Do you have housing?  Yes   Are you worried about losing your housing? No   Lack of transportation? No   Unable to get utilities (heat,electricity)? No         3/3/2024   Dental   Dentist two times every year? Yes         3/3/2024   TB Screening   Were you born outside of [place]?  No         Today's PHQ-2 Score:       3/3/2024     9:47 PM   PHQ-2 ( 1999 [employer])   Q1: Little interest or pleasure in doing things 0   Q2: Feeling down, depressed or hopeless 0   PHQ-2 Score 0   Q1: Little interest or pleasure in doing things Not at all   Q2: Feeling down, depressed or hopeless Not at all   PHQ-2 Score 0           3/3/2024   Substance Use   Alcohol more than 3/day or more than 7/wk No   Do you use any other substances recreationally? No     Social History     Tobacco Use    Smoking status: Never     Passive exposure: Never    Smokeless tobacco: Never   Vaping Use    Vaping Use: Never used   Substance Use Topics    Alcohol use: No    Drug use: No             3/3/2024   One time HIV Screening   Previous HIV test? No         3/3/2024   STI Screening   New sexual partner(s) since last STI/HIV test? No   ASCVD Risk   The 10-year ASCVD risk score (Dex BARRERA, et al., 2019) is: 0.9%    Values used to calculate the score:      Age: 38 years      Sex: Male      Is Non- : No      Diabetic: No      Tobacco smoker: No      Systolic Blood Pressure: 128 mmHg      Is BP treated: No      HDL Cholesterol: 57 mg/dL      Total Cholesterol: 198 mg/dL        3/3/2024   Contraception/Family Planning   Questions about contraception or family planning No        Reviewed and updated as needed this visit by Provider                          Review of Systems  Constitutional, HEENT, cardiovascular, pulmonary, gi and gu systems are negative, except as otherwise noted.     Objective    Exam  /86 (BP Location: Right arm, Patient Position: Sitting, Cuff Size: Adult Large)   Pulse  "64   Temp 97.9  F (36.6  C) (Oral)   Resp 16   Ht 1.772 m (5' 9.75\")   Wt 104.1 kg (229 lb 7 oz)   SpO2 96%   BMI 33.16 kg/m     Estimated body mass index is 33.16 kg/m  as calculated from the following:    Height as of this encounter: 1.772 m (5' 9.75\").    Weight as of this encounter: 104.1 kg (229 lb 7 oz).    Physical Exam  GENERAL: alert and no distress  EYES: Eyes grossly normal to inspection, PERRL and conjunctivae and sclerae normal  HENT: ear canals and TM's normal, nose and mouth without ulcers or lesions  NECK: no adenopathy, no asymmetry, masses, or scars  RESP: lungs clear to auscultation - no rales, rhonchi or wheezes  CV: regular rate and rhythm, normal S1 S2, no S3 or S4, no murmur, click or rub, no peripheral edema  ABDOMEN: soft, nontender, no hepatosplenomegaly, no masses and bowel sounds normal  MS: no gross musculoskeletal defects noted, no edema  SKIN: no suspicious lesions or rashes  NEURO: Normal strength and tone, mentation intact and speech normal  PSYCH: mentation appears normal, affect normal/bright      Signed Electronically by: Gregg Wasserman MD    "

## 2024-03-05 LAB — HCV AB SERPL QL IA: NONREACTIVE

## 2025-04-05 ENCOUNTER — HEALTH MAINTENANCE LETTER (OUTPATIENT)
Age: 40
End: 2025-04-05